# Patient Record
Sex: MALE | Race: WHITE | NOT HISPANIC OR LATINO | Employment: FULL TIME | URBAN - METROPOLITAN AREA
[De-identification: names, ages, dates, MRNs, and addresses within clinical notes are randomized per-mention and may not be internally consistent; named-entity substitution may affect disease eponyms.]

---

## 2023-01-25 ENCOUNTER — HOSPITAL ENCOUNTER (OUTPATIENT)
Facility: HOSPITAL | Age: 33
Discharge: HOME | End: 2023-01-25
Attending: EMERGENCY MEDICINE | Admitting: EMERGENCY MEDICINE
Payer: MEDICAID

## 2023-01-25 VITALS
TEMPERATURE: 97.8 F | SYSTOLIC BLOOD PRESSURE: 105 MMHG | OXYGEN SATURATION: 100 % | HEIGHT: 70 IN | RESPIRATION RATE: 16 BRPM | BODY MASS INDEX: 26.48 KG/M2 | DIASTOLIC BLOOD PRESSURE: 69 MMHG | HEART RATE: 78 BPM | WEIGHT: 185 LBS

## 2023-01-25 DIAGNOSIS — H10.32 ACUTE BACTERIAL CONJUNCTIVITIS OF LEFT EYE: Primary | ICD-10-CM

## 2023-01-25 PROCEDURE — 99203 OFFICE O/P NEW LOW 30 MIN: CPT | Performed by: EMERGENCY MEDICINE

## 2023-01-25 PROCEDURE — G0463 HOSPITAL OUTPT CLINIC VISIT: HCPCS | Performed by: EMERGENCY MEDICINE

## 2023-01-25 RX ORDER — ERYTHROMYCIN 5 MG/G
OINTMENT OPHTHALMIC
Qty: 3.5 G | Refills: 1 | Status: SHIPPED | OUTPATIENT
Start: 2023-01-25 | End: 2023-02-01

## 2023-01-25 RX ORDER — ERYTHROMYCIN 5 MG/G
OINTMENT OPHTHALMIC
Qty: 3.5 G | Refills: 0 | Status: SHIPPED | OUTPATIENT
Start: 2023-01-25 | End: 2023-01-25 | Stop reason: SDUPTHER

## 2023-01-25 NOTE — FSED PROVIDER NOTE
Subjective   History of Present Illness  Patient is a 32-year-old man who presents complaining of left eyelid irritation with redness and crusting.  Symptoms all began 1 day ago and gradually worsening.  This morning the patient woke up with crusting and mattering to the left eyelids.  He denies any eye discomfort or foreign body sensation or trauma or visual changes.  No fevers or nausea or vomiting difficulty swallowing.  Patient does not wear contact lenses.        Review of Systems   Constitutional: Negative for chills and fever.   HENT: Negative for rhinorrhea.    Eyes: Positive for discharge and redness. Negative for photophobia, pain and visual disturbance.   Respiratory: Negative for cough and shortness of breath.    Cardiovascular: Negative for chest pain.   Gastrointestinal: Negative for abdominal pain.   Skin: Negative for rash.   Neurological: Negative for headaches.       History reviewed. No pertinent past medical history.    No Known Allergies    History reviewed. No pertinent surgical history.    History reviewed. No pertinent family history.    Social History     Socioeconomic History   • Marital status:            Objective   Physical Exam  Vitals and nursing note reviewed.   Constitutional:       General: He is not in acute distress.     Appearance: Normal appearance. He is not ill-appearing or toxic-appearing.   HENT:      Head: Normocephalic and atraumatic.      Nose: Nose normal.      Mouth/Throat:      Mouth: Mucous membranes are moist.   Eyes:      General:         Right eye: No discharge.         Left eye: Discharge present.     Extraocular Movements: Extraocular movements intact.      Pupils: Pupils are equal, round, and reactive to light.      Comments: Left eye examination with injected lower conjunctiva with crusting.  Lid everted.  No foreign body noted.  Pupils are equal round and reactive to light.  No periorbital tenderness or erythema.  No pain with eye movement.   Pulmonary:       Effort: Pulmonary effort is normal.   Musculoskeletal:         General: Normal range of motion.   Skin:     General: Skin is warm and dry.      Capillary Refill: Capillary refill takes less than 2 seconds.   Neurological:      General: No focal deficit present.      Mental Status: He is alert.      Sensory: No sensory deficit.      Motor: No weakness.         Procedures           ED Course                                           MDM  Patient is a 32-year-old man who presents with left eye irritation with redness to the lower lid and crusting purulent drainage.  Symptoms all began 1 day ago and gradually worsening.  He has had no fevers or vomiting or diarrhea or headaches.  Patient does not wear any contact lenses.  He does have lower lid redness with evidence of bacterial conjunctivitis.  There is no surrounding erythema and no evidence of periorbital or orbital cellulitis.  Patient be placed on erythromycin ophthalmic ointment.  Final diagnoses:   Acute bacterial conjunctivitis of left eye       ED Disposition  ED Disposition     ED Disposition   Discharge    Condition   Stable    Comment   --             PATIENT CONNECTION - [facility] [address]  [phone]  In 1 week      [address] [address] [phone]    If symptoms worsen         Medication List      New Prescriptions    erythromycin 5 MG/GM ophthalmic ointment  Commonly known as: ROMYCIN  Administer  to the right eye Every 4 (Four) Hours While Awake for 7 days. Please apply half-inch ribbon to lower lid every 4 hours while awake for 7 days.           Where to Get Your Medications      These medications were sent to [facility], [place] - [address] - [phone]  - [phone] [address] [place] [address]    Phone: [phone]   · erythromycin 5 MG/GM ophthalmic ointment

## 2023-01-25 NOTE — Clinical Note
[facility] [place] [address] [place] [address] [place] [address]  Phone: [phone]    Stevie Day was seen and treated in our emergency department on 1/25/2023.  He may return to work on 01/26/2023.         Thank you for choosing [facility].    Austin Kern MD

## 2023-01-25 NOTE — DISCHARGE INSTRUCTIONS
Please apply erythromycin ointment to affected eye as discussed.  Apply warm compress for 10 minutes 4 times a day to affected eye.  Consider washing eyelashes using warm water with baby shampoo and scrubs using a Q-tip.  Seek immediate medical attention if having worsening symptoms or any concerns.

## 2024-02-17 ENCOUNTER — HOSPITAL ENCOUNTER (OUTPATIENT)
Facility: HOSPITAL | Age: 34
Discharge: HOME | End: 2024-02-17
Attending: EMERGENCY MEDICINE
Payer: MEDICAID

## 2024-02-17 VITALS
SYSTOLIC BLOOD PRESSURE: 110 MMHG | DIASTOLIC BLOOD PRESSURE: 72 MMHG | HEART RATE: 74 BPM | HEIGHT: 70 IN | WEIGHT: 190 LBS | BODY MASS INDEX: 27.2 KG/M2 | OXYGEN SATURATION: 98 % | TEMPERATURE: 97.5 F | RESPIRATION RATE: 18 BRPM

## 2024-02-17 DIAGNOSIS — J06.9 UPPER RESPIRATORY TRACT INFECTION, UNSPECIFIED TYPE: Primary | ICD-10-CM

## 2024-02-17 LAB
FLUAV SUBTYP SPEC NAA+PROBE: NOT DETECTED
FLUBV RNA ISLT QL NAA+PROBE: NOT DETECTED
SARS-COV-2 RNA RESP QL NAA+PROBE: NOT DETECTED

## 2024-02-17 PROCEDURE — G0463 HOSPITAL OUTPT CLINIC VISIT: HCPCS | Performed by: EMERGENCY MEDICINE

## 2024-02-17 PROCEDURE — 99213 OFFICE O/P EST LOW 20 MIN: CPT | Performed by: EMERGENCY MEDICINE

## 2024-02-17 PROCEDURE — 87636 SARSCOV2 & INF A&B AMP PRB: CPT | Performed by: EMERGENCY MEDICINE

## 2024-02-17 RX ORDER — AZITHROMYCIN 250 MG/1
TABLET, FILM COATED ORAL
Qty: 6 TABLET | Refills: 0 | Status: SHIPPED | OUTPATIENT
Start: 2024-02-17

## 2024-02-17 NOTE — FSED PROVIDER NOTE
Subjective   History of Present Illness  Patient is a well-appearing 33-year-old male who presents to the emergency room complaining of sinus pain and congestion for the past 2 weeks.  He states he has a fullness in his left ear.  He has had a cough as well but his cough has been improving.  He is afebrile and otherwise asymptomatic  Review of Systems   All other systems reviewed and are negative.      No past medical history on file.    Allergies   Allergen Reactions    Sulfa Antibiotics Unknown - Low Severity       No past surgical history on file.    No family history on file.    Social History     Socioeconomic History    Marital status:            Objective   Physical Exam  Vitals and nursing note reviewed.   Constitutional:       Appearance: Normal appearance.   HENT:      Head: Normocephalic and atraumatic.      Right Ear: Tympanic membrane normal.      Ears:      Comments: Erythematous left TM with no bulging     Nose: Nose normal.      Mouth/Throat:      Mouth: Mucous membranes are moist.   Eyes:      Extraocular Movements: Extraocular movements intact.      Pupils: Pupils are equal, round, and reactive to light.   Cardiovascular:      Rate and Rhythm: Normal rate and regular rhythm.   Pulmonary:      Effort: Pulmonary effort is normal.      Breath sounds: Normal breath sounds.   Abdominal:      General: Abdomen is flat.   Musculoskeletal:         General: Normal range of motion.      Cervical back: Normal range of motion and neck supple.   Skin:     General: Skin is warm and dry.   Neurological:      General: No focal deficit present.      Mental Status: He is alert and oriented to person, place, and time.         Procedures           ED Course                                           Medical Decision Making  Patient is a well-appearing 33-year-old male who presents to the emergency room complaining of upper respiratory and sinus congestion for the past 2 weeks.  Given the duration of his symptoms  he will be discharged home with a Z-Harman.  He does have an unremarkable physical exam.  He is advised to follow-up with his primary care physician in 1 to 2 days return to the ED for any worsening in symptoms.    Problems Addressed:  Upper respiratory tract infection, unspecified type: complicated acute illness or injury    Risk  Prescription drug management.        Final diagnoses:   Upper respiratory tract infection, unspecified type       ED Disposition  ED Disposition       ED Disposition   Discharge    Condition   Stable    Comment   --               Samara Ramirez, APRTR  [address] [facility] [place] [address] [phone]               Medication List        New Prescriptions      azithromycin 250 MG tablet  Commonly known as: ZITHROMAX  Take 2 tabs on day 1 and 1 tab daily for the next 4 days.               Where to Get Your Medications        These medications were sent to [facility] [address] [place] [address] [place] - [address] [facility] - [phone]  - [phone]   [address] [facility] [place] [address]      Phone: [phone]   azithromycin 250 MG tablet

## 2024-09-03 ENCOUNTER — HOSPITAL ENCOUNTER (OUTPATIENT)
Facility: HOSPITAL | Age: 34
Discharge: HOME | End: 2024-09-03
Attending: EMERGENCY MEDICINE | Admitting: EMERGENCY MEDICINE
Payer: MEDICAID

## 2024-09-03 VITALS
HEART RATE: 79 BPM | HEIGHT: 70 IN | DIASTOLIC BLOOD PRESSURE: 75 MMHG | RESPIRATION RATE: 20 BRPM | TEMPERATURE: 97.9 F | BODY MASS INDEX: 27.53 KG/M2 | OXYGEN SATURATION: 98 % | WEIGHT: 192.3 LBS | SYSTOLIC BLOOD PRESSURE: 118 MMHG

## 2024-09-03 DIAGNOSIS — J02.0 STREP THROAT: Primary | ICD-10-CM

## 2024-09-03 LAB — STREP A PCR: DETECTED

## 2024-09-03 PROCEDURE — 99213 OFFICE O/P EST LOW 20 MIN: CPT | Performed by: NURSE PRACTITIONER

## 2024-09-03 PROCEDURE — 87651 STREP A DNA AMP PROBE: CPT | Performed by: EMERGENCY MEDICINE

## 2024-09-03 PROCEDURE — G0463 HOSPITAL OUTPT CLINIC VISIT: HCPCS | Performed by: NURSE PRACTITIONER

## 2024-09-03 RX ORDER — AMOXICILLIN 500 MG/1
1000 CAPSULE ORAL 2 TIMES DAILY
Qty: 40 CAPSULE | Refills: 0 | Status: SHIPPED | OUTPATIENT
Start: 2024-09-03 | End: 2024-09-13

## 2024-09-03 NOTE — DISCHARGE INSTRUCTIONS
Follow-up with primary care for further evaluation and treatment as needed.    Tylenol/Motrin as needed for pain/fevers    Amoxil, antibiotic, as prescribed, make sure patient completes the 10 day course, of antibiotics.     Make sure patient is drinking plenty of fluids.    Once the patient has been on antibiotics for 36 hours you should change toothbrush, and then again when the patient has finished the antibiotics you should change the toothbrush again.    You can also get Claritin, Allegra or Zyrtec over-the-counter take as per manufacturing directions.    You can also use Flonase nasal spray over-the-counter and use as per     Return for any new or worsening symptoms.

## 2024-09-03 NOTE — FSED PROVIDER NOTE
Subjective   History of Present Illness  The patient is a 34-year-old male who presents to the ER with a sore throat for the past 3 days.  Patient also reports sinus pressure and pain.    History provided by:  Patient   used: No        Review of Systems   HENT:  Positive for postnasal drip and sore throat.        History reviewed. No pertinent past medical history.    Allergies   Allergen Reactions    Sulfa Antibiotics Unknown - Low Severity       History reviewed. No pertinent surgical history.    History reviewed. No pertinent family history.    Social History     Socioeconomic History    Marital status:            Objective   Physical Exam  Vitals and nursing note reviewed.   Constitutional:       Appearance: Normal appearance. He is well-developed.   HENT:      Head: Normocephalic.      Right Ear: Tympanic membrane and ear canal normal.      Left Ear: Tympanic membrane and ear canal normal.      Nose: Nose normal.      Mouth/Throat:      Lips: Pink.      Mouth: Mucous membranes are moist.      Pharynx: Oropharynx is clear. Uvula midline.      Tonsils: 1+ on the right. 1+ on the left.   Eyes:      Conjunctiva/sclera: Conjunctivae normal.      Pupils: Pupils are equal, round, and reactive to light.   Cardiovascular:      Rate and Rhythm: Normal rate and regular rhythm.      Pulses: Normal pulses.      Heart sounds: Normal heart sounds.   Pulmonary:      Effort: Pulmonary effort is normal.      Breath sounds: Normal breath sounds.   Abdominal:      General: Bowel sounds are normal.      Palpations: Abdomen is soft.   Musculoskeletal:      Cervical back: Full passive range of motion without pain, normal range of motion and neck supple.   Skin:     General: Skin is warm.   Neurological:      General: No focal deficit present.      Mental Status: He is alert and oriented to person, place, and time.   Psychiatric:         Mood and Affect: Mood normal.         Behavior: Behavior normal.  Behavior is cooperative.         Procedures           ED Course  ED Course as of 09/03/24 1009   Tue Sep 03, 2024   1001 STREP A PCR(!): Detected [DS]      ED Course User Index  [DS] Nya Parsons APRN                                           Medical Decision Making  The patient is a 34-year-old male who presents to the ER with a sore throat for the past 3 days.  Patient also reports sinus pressure and pain.    Patient is positive for strep.    No history of immunocompromise. Nontoxic appearance. Patient euvolemic with no trismus. No airway compromise. No change in voice, exudates, enlarged lymph nodes. Able to tolerate PO. Given History and Exam I have low suspicion for this presentation being caused by PTA, RPA, Ludwigs angina, Epiglottitis or Bacterial Tracheitis, EBV, acute HIV.     Will Rx for amoxicillin at this time.    Amount and/or Complexity of Data Reviewed  Labs:  Decision-making details documented in ED Course.    Risk  Prescription drug management.        Final diagnoses:   Strep throat       ED Disposition  ED Disposition       ED Disposition   Discharge    Condition   Stable    Comment   --               Samara Ramirez APRN  [address] [facility] [place] [address] [phone]    Schedule an appointment as soon as possible for a visit in 1 week  As needed, If symptoms worsen         Medication List        New Prescriptions      amoxicillin 500 MG capsule  Commonly known as: AMOXIL  Take 2 capsules by mouth 2 (Two) Times a Day for 10 days.               Where to Get Your Medications        These medications were sent to [facility] [address] [place] [address], [place] - [address] [facility] - [phone]  - [phone]   [address] [facility] [place] [address]      Phone: [phone]   amoxicillin 500 MG capsule

## 2025-06-08 ENCOUNTER — HOSPITAL ENCOUNTER (OUTPATIENT)
Facility: HOSPITAL | Age: 35
Discharge: HOME | End: 2025-06-08
Attending: EMERGENCY MEDICINE | Admitting: EMERGENCY MEDICINE
Payer: OTHER GOVERNMENT

## 2025-06-08 VITALS
DIASTOLIC BLOOD PRESSURE: 77 MMHG | HEART RATE: 77 BPM | SYSTOLIC BLOOD PRESSURE: 117 MMHG | HEIGHT: 70 IN | WEIGHT: 185 LBS | RESPIRATION RATE: 18 BRPM | BODY MASS INDEX: 26.48 KG/M2 | TEMPERATURE: 98.7 F | OXYGEN SATURATION: 99 %

## 2025-06-08 DIAGNOSIS — L01.00 IMPETIGO: Primary | ICD-10-CM

## 2025-06-08 PROCEDURE — G0463 HOSPITAL OUTPT CLINIC VISIT: HCPCS

## 2025-06-08 RX ORDER — DOXYCYCLINE 100 MG/1
100 CAPSULE ORAL 2 TIMES DAILY
Qty: 20 CAPSULE | Refills: 0 | Status: SHIPPED | OUTPATIENT
Start: 2025-06-08 | End: 2025-06-08

## 2025-06-08 RX ORDER — MUPIROCIN 20 MG/G
1 OINTMENT TOPICAL 3 TIMES DAILY
Qty: 30 G | Refills: 0 | Status: SHIPPED | OUTPATIENT
Start: 2025-06-08 | End: 2025-06-08

## 2025-06-08 RX ORDER — DOXYCYCLINE 100 MG/1
100 CAPSULE ORAL 2 TIMES DAILY
Qty: 20 CAPSULE | Refills: 0 | Status: SHIPPED | OUTPATIENT
Start: 2025-06-08 | End: 2025-06-18

## 2025-06-08 RX ORDER — MUPIROCIN 20 MG/G
1 OINTMENT TOPICAL 3 TIMES DAILY
Qty: 30 G | Refills: 0 | Status: SHIPPED | OUTPATIENT
Start: 2025-06-08

## 2025-06-08 NOTE — FSED PROVIDER NOTE
Subjective   History of Present Illness  35-year-old male reports 3 days ago possibly being bitten by an insect on his right lower leg.  He reports that the area has now become reddened and swollen and is draining clear to yellow fluid.  He reports that he has scratched the area and possibly may have touched his left leg and arm and now has similar areas present there.  He is denying fever.  He denies history of staph infection.  He reports using bacitracin ointment but not getting relief.        Review of Systems   All other systems reviewed and are negative.      History reviewed. No pertinent past medical history.    Allergies   Allergen Reactions    Sulfa Antibiotics Unknown - Low Severity       History reviewed. No pertinent surgical history.    History reviewed. No pertinent family history.    Social History     Socioeconomic History    Marital status:            Objective   Physical Exam  Vitals and nursing note reviewed.   Constitutional:       General: He is not in acute distress.     Appearance: Normal appearance. He is not toxic-appearing.   HENT:      Head: Normocephalic and atraumatic.      Right Ear: Ear canal normal.      Left Ear: Ear canal normal.      Nose: Nose normal.      Mouth/Throat:      Mouth: Mucous membranes are moist.   Eyes:      Extraocular Movements: Extraocular movements intact.      Conjunctiva/sclera: Conjunctivae normal.      Pupils: Pupils are equal, round, and reactive to light.   Cardiovascular:      Rate and Rhythm: Normal rate.      Pulses: Normal pulses.      Heart sounds: Normal heart sounds.   Pulmonary:      Effort: Pulmonary effort is normal. No respiratory distress.      Breath sounds: Normal breath sounds.   Abdominal:      General: Abdomen is flat. Bowel sounds are normal.      Palpations: Abdomen is soft.   Musculoskeletal:         General: Normal range of motion.      Cervical back: Normal range of motion and neck supple.   Skin:     General: Skin is warm.       Capillary Refill: Capillary refill takes less than 2 seconds.      Comments: Patient's right lower extremity upper tibia region there is a well-circumscribed area of redness with crusted lesions that is draining a clear fluid.    To the left leg into multiple areas of the patient's arm are singular lesions that are slightly pink slightly raised but not blistered   Neurological:      General: No focal deficit present.      Mental Status: He is alert and oriented to person, place, and time. Mental status is at baseline.         Procedures           ED Course                                           Medical Decision Making  Presentation appears consistent with impetigo.  Will discharge home with doxycycline and Bactroban.  Recommend warm compresses    Problems Addressed:  Impetigo: complicated acute illness or injury    Risk  Prescription drug management.        Final diagnoses:   Impetigo       ED Disposition  ED Disposition       ED Disposition   Discharge    Condition   Stable    Comment   --               Samara Ramirez, APRN  [address] [facility] [place] [address] [phone]               Medication List        New Prescriptions      doxycycline 100 MG capsule  Commonly known as: MONODOX  Take 1 capsule by mouth 2 (Two) Times a Day for 10 days.     mupirocin 2 % ointment  Commonly known as: BACTROBAN  Apply 1 Application topically to the appropriate area as directed 3 (Three) Times a Day.               Where to Get Your Medications        These medications were sent to [facility] [id number] - [place], [place] - [facility] - [phone]  - [phone] [facility] [place] [address]      Phone: [phone]   doxycycline 100 MG capsule  mupirocin 2 % ointment

## 2025-06-08 NOTE — DISCHARGE INSTRUCTIONS
Take Tylenol or ibuprofen as needed for pain    Begin doxycycline for treatment of bacterial skin infection    To the skin infection of the right lower leg that is blistered and draining apply Bactroban ointment and cover with dressing    Recommend warm compresses to areas of rash and skin infection to facilitate blood flow and healing several times daily    Avoid picking or touching the areas that are blistered and draining as this could cause more spread    You should see improvement in the next 2 to 3 days for worsening symptoms return to ER

## 2025-06-12 ENCOUNTER — HOSPITAL ENCOUNTER (OUTPATIENT)
Facility: HOSPITAL | Age: 35
Discharge: HOME | End: 2025-06-12
Attending: EMERGENCY MEDICINE | Admitting: EMERGENCY MEDICINE
Payer: OTHER GOVERNMENT

## 2025-06-12 VITALS
SYSTOLIC BLOOD PRESSURE: 110 MMHG | WEIGHT: 183.6 LBS | TEMPERATURE: 98.5 F | HEART RATE: 81 BPM | HEIGHT: 70 IN | BODY MASS INDEX: 26.28 KG/M2 | OXYGEN SATURATION: 99 % | DIASTOLIC BLOOD PRESSURE: 74 MMHG | RESPIRATION RATE: 18 BRPM

## 2025-06-12 DIAGNOSIS — L01.00 IMPETIGO: Primary | ICD-10-CM

## 2025-06-12 DIAGNOSIS — L30.9 DERMATITIS: ICD-10-CM

## 2025-06-12 PROCEDURE — 99213 OFFICE O/P EST LOW 20 MIN: CPT | Performed by: EMERGENCY MEDICINE

## 2025-06-12 PROCEDURE — 63710000001 PREDNISONE PER 1 MG: Performed by: EMERGENCY MEDICINE

## 2025-06-12 PROCEDURE — G0463 HOSPITAL OUTPT CLINIC VISIT: HCPCS | Performed by: EMERGENCY MEDICINE

## 2025-06-12 RX ORDER — HYDROXYZINE PAMOATE 50 MG/1
50 CAPSULE ORAL 3 TIMES DAILY PRN
Qty: 30 CAPSULE | Refills: 0 | Status: SHIPPED | OUTPATIENT
Start: 2025-06-12

## 2025-06-12 RX ORDER — BETAMETHASONE DIPROPIONATE 0.5 MG/G
1 CREAM TOPICAL 2 TIMES DAILY
Qty: 20 G | Refills: 0 | Status: SHIPPED | OUTPATIENT
Start: 2025-06-12 | End: 2025-06-22

## 2025-06-12 RX ORDER — PREDNISONE 20 MG/1
60 TABLET ORAL ONCE
Status: COMPLETED | OUTPATIENT
Start: 2025-06-12 | End: 2025-06-12

## 2025-06-12 RX ORDER — CEPHALEXIN 500 MG/1
500 CAPSULE ORAL 4 TIMES DAILY
Qty: 40 CAPSULE | Refills: 0 | Status: SHIPPED | OUTPATIENT
Start: 2025-06-12 | End: 2025-06-22

## 2025-06-12 RX ADMIN — PREDNISONE 60 MG: 20 TABLET ORAL at 07:49

## 2025-06-12 NOTE — FSED PROVIDER NOTE
Subjective   History of Present Illness  35-year-old male treated for impetigo starting on the eighth with Doxy, says the area on his leg is less swollen and not as dark red but he feels like it is slightly larger, also got a couple spots on his right thigh left arm.  He says all the spots are itchy.  He thought maybe he was bitten by a bug, does admit to working outside but does not think he could have been exposed to poison ivy  Review of Systems  As noted in HPI  No past medical history on file.    Allergies   Allergen Reactions    Sulfa Antibiotics Unknown - Low Severity       No past surgical history on file.    No family history on file.    Social History     Socioeconomic History    Marital status:            Objective   Physical Exam    INITIAL VITAL SIGNS: Reviewed by me.  Pulse ox normal  GENERAL: Alert. Well developed and well nourished. No respiratory distress.  HEAD: Normocephalic.   EYES: No conjunctival injection.  ENT: Oral mucosa is moist.  NECK/BACK: Supple. Full range of motion.  RESPIRATORY: Non-labored respirations.  EXTREMITIES: No deformity.  Right anteromedial lower leg with approximately 10 cm area of honey crusted lesions slight surrounding erythema, the spots on his arms are much smaller they are barely red slightly vesicular.  SKIN: Warm and dry. No rashes. No diaphoresis.  NEUROLOGIC: Alert. Normal gait    Procedures           ED Course  ED Course as of 06/12/25 0808   Thu Jun 12, 2025   0807 Patient looks like he has a large impetigo lesion on his right leg the other lesions look more like a contact dermatitis, he says even the lesion on his leg is quite itchy, question whether this could be a dermatitis of some sort, will add Keflex also giving him a  a single dose of oral steroids see if that helps the itching we will give him some Atarax as well.  Given him a topical steroid to help with if the oral steroid helps the itching. [RO]      ED Course User Index  [RO] Hailey  MD Win                                           Medical Decision Making  Problems Addressed:  Dermatitis: complicated acute illness or injury  Impetigo: complicated acute illness or injury    Risk  Prescription drug management.        Final diagnoses:   Impetigo   Dermatitis       ED Disposition  ED Disposition       ED Disposition   Discharge    Condition   Good    Comment   --               PATIENT CONNECTION - [facility]  [place] [address]  [phone]  Call   For assistance with appointment.         Medication List        New Prescriptions      betamethasone dipropionate 0.05 % cream  Commonly known as: DIPROSONE  Apply 1 Application topically to the appropriate area as directed 2 (Two) Times a Day for 10 days. Use if steroid dose given at urgent care helps the itching.     cephalexin 500 MG capsule  Commonly known as: KEFLEX  Take 1 capsule by mouth 4 (Four) Times a Day for 10 days.     hydrOXYzine pamoate 50 MG capsule  Commonly known as: VISTARIL  Take 1 capsule by mouth 3 (Three) Times a Day As Needed for Itching.               Where to Get Your Medications        These medications were sent to [facility] [address] [place] [address] [place] - [address] [facility] - [phone] [facility] [phone]   [address] [facility] [place] [address]      Phone: [phone]   betamethasone dipropionate 0.05 % cream  cephalexin 500 MG capsule  hydrOXYzine pamoate 50 MG capsule

## 2025-06-12 NOTE — DISCHARGE INSTRUCTIONS
Take medications as prescribed.  Follow-up with your doctor, if you do not have 1 call patient for assistance with establishing care.